# Patient Record
Sex: MALE | Race: WHITE
[De-identification: names, ages, dates, MRNs, and addresses within clinical notes are randomized per-mention and may not be internally consistent; named-entity substitution may affect disease eponyms.]

---

## 2019-10-07 PROBLEM — Z00.00 ENCOUNTER FOR PREVENTIVE HEALTH EXAMINATION: Status: ACTIVE | Noted: 2019-10-07

## 2019-10-10 ENCOUNTER — APPOINTMENT (OUTPATIENT)
Dept: ORTHOPEDIC SURGERY | Facility: CLINIC | Age: 64
End: 2019-10-10
Payer: COMMERCIAL

## 2019-10-10 PROCEDURE — 99213 OFFICE O/P EST LOW 20 MIN: CPT

## 2019-10-10 PROCEDURE — 73562 X-RAY EXAM OF KNEE 3: CPT | Mod: 50

## 2019-10-14 NOTE — PHYSICAL EXAM
[de-identified] : Bilateral knee\par \par Constitutional: \par The patient is healthy-appearing and in no apparent distress. \par \par Gait:\par The patient ambulates with a normal gait and no limp.\par \par Cardiovascular System: \par The capillary refill is less than 2 seconds. \par \par Skin: \par There are no skin abnormalities.\par \par Right Knee:\par  \par Bony Palpation: \par There is no tenderness of the medial joint line. \par There is no tenderness of the lateral joint line.\par There is tenderness of the medial femoral chondyle.\par There is no tenderness of the lateral femoral chondyle.\par There is no tenderness of the tibial tubercle.\par There is no tenderness of the superior patella.\par There is no tenderness of the inferior patella.\par There is tenderness of the medial patellar facet.\par There is tenderness of the lateral patellar facet.\par \par Soft Tissue Palpation: \par There is no tenderness of the medial retinaculum.\par There is no tenderness of the lateral retinaculum.\par There is no tenderness of the quadriceps tendon.\par There is no tenderness of the patella tendon.\par There is no tenderness of the ITB.\par There is no tenderness of the pes anserine.\par \par Active Range of Motion: \par The range of motion at the knee actively and passively is full. \par \par Special Tests: \par There is a negative Apley.\par There is a negative Steinmanns. \par There is a negative Lachman and Anterior Drawer.\par There is a negative Posterior Drawer.  \par There is no varus or valgus laxity.\par Strength: \par There is 5/5 hip flexion and 5/5 knee flexion and extension.  \par \par Left Knee:\par  \par Bony Palpation: \par There is no tenderness of the medial joint line. \par There is no tenderness of the lateral joint line.\par There is tenderness of the medial femoral chondyle.\par There is no tenderness of the lateral femoral chondyle.\par There is no tenderness of the tibial tubercle.\par There is no tenderness of the superior patella.\par There is no tenderness of the inferior patella.\par There is tenderness of the medial patellar facet.\par There is tenderness of the lateral patellar facet.\par \par Soft Tissue Palpation: \par There is no tenderness of the medial retinaculum.\par There is no tenderness of the lateral retinaculum.\par There is no tenderness of the quadriceps tendon.\par There is no tenderness of the patella tendon.\par There is no tenderness of the ITB.\par There is no tenderness of the pes anserine.\par \par Active Range of Motion: \par The range of motion at the knee actively and passively is full. \par \par Special Tests: \par There is a negative Apley.\par There is a negative Steinmanns. \par There is a negative Lachman and Anterior Drawer.\par There is a negative Posterior Drawer.  \par There is no varus or valgus laxity.\par \par Strength: \par There is 5/5 hip flexion and 5/5 knee flexion and extension.  \par \par Psychiatric: \par The patient demonstrates a normal mood and affect and is active and alert [de-identified] : X-ray bilateral knee. There is mild medial lateral compartment arthritis and mild to moderate patellofemoral arthritis

## 2019-10-14 NOTE — ASSESSMENT
[FreeTextEntry1] : Patient given home exercises. If no improvement consideration of cortisone injections\par

## 2019-10-14 NOTE — HISTORY OF PRESENT ILLNESS
[de-identified] : Patient is here for both knees today, primarily the LEFT. Patient has previously seen Dr. Troy. Pain is worst at night, wakes him up. Pain with standing and going down stairs. Clicking in left knee.

## 2019-10-16 ENCOUNTER — RX RENEWAL (OUTPATIENT)
Age: 64
End: 2019-10-16

## 2019-10-16 RX ORDER — NABUMETONE 500 MG/1
500 TABLET, FILM COATED ORAL
Qty: 30 | Refills: 2 | Status: ACTIVE | COMMUNITY
Start: 2019-10-16 | End: 1900-01-01

## 2021-02-11 ENCOUNTER — APPOINTMENT (OUTPATIENT)
Dept: ORTHOPEDIC SURGERY | Facility: CLINIC | Age: 66
End: 2021-02-11
Payer: COMMERCIAL

## 2021-02-11 PROCEDURE — 99213 OFFICE O/P EST LOW 20 MIN: CPT

## 2021-02-11 PROCEDURE — 99072 ADDL SUPL MATRL&STAF TM PHE: CPT

## 2021-02-11 PROCEDURE — 73562 X-RAY EXAM OF KNEE 3: CPT | Mod: LT

## 2021-02-11 NOTE — ASSESSMENT
[FreeTextEntry1] : Discussed at length with patient exam history and imaging at this time patient elects home exercises only.  Offered anti-inflammatories physical therapy and injection and patient declines.  If persistent discomfort patient to follow up in office

## 2021-02-11 NOTE — HISTORY OF PRESENT ILLNESS
[de-identified] : Patient is an established patient last seen back in October 2019 for bilateral knee arthritis.  He states overall he been doing rather well less so we just had some left knee discomfort atraumatic in onset.  Denies any swelling.  States some improvement with anti-inflammatory.  States mild discomfort with stairs onlyPatient is an established patient last seen back in October 2019 for bilateral knee arthritis.  He states overall he been doing rather well less so we just had some left knee discomfort atraumatic in onset.  Denies any swelling.  States some improvement with anti-inflammatory.  States mild discomfort with stairs only

## 2021-02-11 NOTE — PHYSICAL EXAM
[de-identified] : Left knee\par \par Constitutional: \par The patient is healthy-appearing and in no apparent distress. \par \par Gait:\par The patient ambulates with a normal gait and no limp.\par \par Cardiovascular System: \par The capillary refill is less than 2 seconds. \par \par Skin: \par There are no skin abnormalities.\par \par Left Knee:\par  \par Bony Palpation: \par There is no tenderness of the medial joint line. \par There is no tenderness of the lateral joint line.\par There is no tenderness of the medial femoral chondyle.\par There is no tenderness of the lateral femoral chondyle.\par There is no tenderness of the tibial tubercle.\par There is no tenderness of the superior patella.\par There is no tenderness of the inferior patella.\par There is tenderness of the medial patellar facet.\par There is tenderness of the lateral patellar facet.\par \par Soft Tissue Palpation: \par There is no tenderness of the medial retinaculum.\par There is no tenderness of the lateral retinaculum.\par There is no tenderness of the quadriceps tendon.\par There is no tenderness of the patella tendon.\par There is no tenderness of the ITB.\par There is no tenderness of the pes anserine.\par \par Active Range of Motion: \par The range of motion at the knee actively and passively is full. \par \par Special Tests: \par There is a negative Apley.\par There is a negative Steinmanns. \par There is a negative Lachman and Anterior Drawer.\par There is a negative Posterior Drawer.  \par There is no varus or valgus laxity.\par \par Strength: \par There is 5/5 hip flexion and 5/5 knee flexion and extension.  \par \par Psychiatric: \par The patient demonstrates a normal mood and affect and is active and alert [de-identified] : X-ray left knee.  There is mild tricompartmental arthritis\par

## 2021-02-25 ENCOUNTER — TRANSCRIPTION ENCOUNTER (OUTPATIENT)
Age: 66
End: 2021-02-25

## 2021-11-04 ENCOUNTER — APPOINTMENT (OUTPATIENT)
Dept: ORTHOPEDIC SURGERY | Facility: CLINIC | Age: 66
End: 2021-11-04
Payer: MEDICARE

## 2021-11-04 DIAGNOSIS — M25.551 PAIN IN RIGHT HIP: ICD-10-CM

## 2021-11-04 DIAGNOSIS — M70.60 TROCHANTERIC BURSITIS, UNSPECIFIED HIP: ICD-10-CM

## 2021-11-04 PROCEDURE — 72100 X-RAY EXAM L-S SPINE 2/3 VWS: CPT

## 2021-11-04 PROCEDURE — 99213 OFFICE O/P EST LOW 20 MIN: CPT

## 2021-11-04 PROCEDURE — 73502 X-RAY EXAM HIP UNI 2-3 VIEWS: CPT | Mod: RT

## 2021-11-04 NOTE — ASSESSMENT
[FreeTextEntry1] : Discussed at length with patient exam history and imaging as well as treatment options.  Discussed in particular stiffness and tightness of the IT band and recommendation for home exercises.  Offered physical therapy but patient elects home exercises only at this time and he will continue on his gabapentin.  If no improvement consideration of MRI of lumbar spine consideration of epidural steroid injection and possible need for a spine surgeon referral if nonoperative treatment provide relief.\par

## 2021-11-04 NOTE — PHYSICAL EXAM
[de-identified] : Lumbar back\par \par Constitutional: \par The patient is healthy-appearing and in no apparent distress. \par \par Gait and Station: \par The patient ambulates with a normal gait, no significant limp. \par \par Cardiovascular System: \par There is bilateral lower extremity capillary refill less than 2 seconds. \par \par Skin: \par There are no skin abnormalities of the lumbar spine.\par \par Lumbar Spine: \par \par Inspection: \par There is no induration, ecchymosis, or swelling. \par \par Bony Palpation: \par There is no tenderness of the spinous processes.\par There is no tenderness of the iliac crest.\par There is no tenderness of either ASIS.\par There is no tenderness of either PSIS\par There is no tenderness of the greater trochanters.\par There is no tenderness of the right SI joint.\par There is no tenderness of the left SI joint. \par \par Soft Tissue Palpation:\par There is tenderness of the paraspinal region at L4/5. \par  \par Active Range of Motion: \par There is normal lateral flexion and rotation. \par \par Motor Strength: \par There is 5/5 hip flexion, knee extension and flexion, ankle dorsiflexion and plantarflexion.\par \par Neurological System: \par There is normal sensation to light touch on bilateral lower extremities.\par There is a negative supine straight leg raising test.\par There is a negative seated straight leg raising test.  \par There is no clonus. \par \par Right hip\par \par Constitutional: \par The patient is healthy-appearing and in no apparent distress. \par \par Gait:\par The patient ambulates with a normal gait and no significant limp.\par \par Cardiovascular System: \par There is capillary refill less than 2 seconds. \par \par Skin: \par There is no skin abnormalities.\par \par Lumbar Spine;\par There is no significance malalignment and no tenderness to the paraspinal musculature.\par \par Right hip:\par \par Bony Palpation: \par There is no tenderness of the iliac crest.\par There is no tenderness of the ASIS.\par There is no tenderness of the PSIS.\par There is no tenderness of the SI joint.\par There is tenderness of the greater trochanter. \par \par Soft Tissue Palpation: \par There is no tenderness of the hip adductors.\par There is no tenderness of the hip abductors.\par There is no tenderness of the hamstrings. \par There is no tenderness of the piriformis. \par There is no tenderness of the hip flexors.\par \par Active Range of Motion: \par There is full range of motion at the hip actively and passively. \par \par Special Tests: \par There is a positive Edwin's test.\par There is a negative Rik-Michael test. \par \par Strength: \par There is 5/5 hip flexion, adduction, abduction.  \par \par Psychiatric: \par The patient demonstrates a normal mood and affect and is active and alert.\par \par \par \par \par \par  [de-identified] : Given patient's reported history and physical examination, x-ray evaluation ( as listed below ) was ordered and performed to aid in diagnosis and treatment of the patient.\par X-ray right hip.  There is minimal medial joint space narrowing of the hip otherwise no significant bony abnormality.\par X-ray lumbar spine.  There is a one spondylolisthesis of L4 on 5 with corresponding foraminal stenosis and mild L3-4 disc space narrowing.  This is consistent with positioning of an MRI from one year ago\par

## 2021-11-04 NOTE — HISTORY OF PRESENT ILLNESS
[de-identified] : Location: Right hip ( lateral ) and lumbar back pain\par Duration: 1 year - worse in the last 3 days\par Context: atraumatic\par Quality: sharp, achy\par Aggravating factors: lying down\par Conservative treatment: Gabapentin, stretching, PT\par Prior studies: Right hip MRI (10/26/21), Lumbar spine MRI (12/14/20 [facility]), EMG (11/24/20 [place])

## 2022-03-24 ENCOUNTER — APPOINTMENT (OUTPATIENT)
Dept: ORTHOPEDIC SURGERY | Facility: CLINIC | Age: 67
End: 2022-03-24
Payer: MEDICARE

## 2022-03-24 DIAGNOSIS — M54.50 LOW BACK PAIN, UNSPECIFIED: ICD-10-CM

## 2022-03-24 DIAGNOSIS — M43.10 SPONDYLOLISTHESIS, SITE UNSPECIFIED: ICD-10-CM

## 2022-03-24 DIAGNOSIS — M48.061 SPINAL STENOSIS, LUMBAR REGION WITHOUT NEUROGENIC CLAUDICATION: ICD-10-CM

## 2022-03-24 PROCEDURE — 99213 OFFICE O/P EST LOW 20 MIN: CPT

## 2022-03-24 NOTE — HISTORY OF PRESENT ILLNESS
[de-identified] : Patient is an established patient seen back in November for lumbar back pain with sciatica.  He states overall with home exercises he has improved and intermittently has some discomfort denies any recent fall or trauma denies any weakness.  He states his neurologist recently increased his gabapentin and he feels this has helped his symptoms

## 2022-03-24 NOTE — PHYSICAL EXAM
[de-identified] : Lumbar back\par \par Constitutional: \par The patient is healthy-appearing and in no apparent distress. \par \par Gait and Station: \par The patient ambulates with a normal gait, no significant limp. \par \par Cardiovascular System: \par There is bilateral lower extremity capillary refill less than 2 seconds. \par \par Skin: \par There are no skin abnormalities of the lumbar spine.\par \par Lumbar Spine: \par \par Inspection: \par There is no induration, ecchymosis, or swelling. \par \par Bony Palpation: \par There is no tenderness of the spinous processes.\par There is no tenderness of the iliac crest.\par There is no tenderness of either ASIS.\par There is no tenderness of either PSIS\par There is no tenderness of the greater trochanters.\par There is no tenderness of the right SI joint.\par There is no tenderness of the left SI joint. \par \par Soft Tissue Palpation:\par There is tenderness of the paraspinal region at L4/5. \par  \par Active Range of Motion: \par There is normal lateral flexion and rotation. \par \par Motor Strength: \par There is 5/5 hip flexion, knee extension and flexion, ankle dorsiflexion and plantarflexion.\par \par Neurological System: \par There is normal sensation to light touch on bilateral lower extremities.\par There is a negative supine straight leg raising test.\par There is a negative seated straight leg raising test.  \par There is no clonus. \par \par Right hip\par \par Constitutional: \par The patient is healthy-appearing and in no apparent distress. \par \par Gait:\par The patient ambulates with a normal gait and no significant limp.\par \par Cardiovascular System: \par There is capillary refill less than 2 seconds. \par \par Skin: \par There is no skin abnormalities.\par \par Lumbar Spine;\par There is no significance malalignment and no tenderness to the paraspinal musculature.\par \par Right hip:\par \par Bony Palpation: \par There is no tenderness of the iliac crest.\par There is no tenderness of the ASIS.\par There is no tenderness of the PSIS.\par There is no tenderness of the SI joint.\par There is tenderness of the greater trochanter. \par \par Soft Tissue Palpation: \par There is no tenderness of the hip adductors.\par There is no tenderness of the hip abductors.\par There is no tenderness of the hamstrings. \par There is no tenderness of the piriformis. \par There is no tenderness of the hip flexors.\par \par Active Range of Motion: \par There is full range of motion at the hip actively and passively. \par \par Special Tests: \par There is a positive Edwin's test.\par There is a negative Rik-Michael test. \par \par Strength: \par There is 5/5 hip flexion, adduction, abduction.  \par \par Psychiatric: \par The patient demonstrates a normal mood and affect and is active and alert.\par \par \par \par \par \par

## 2022-09-08 ENCOUNTER — APPOINTMENT (OUTPATIENT)
Dept: ORTHOPEDIC SURGERY | Facility: CLINIC | Age: 67
End: 2022-09-08

## 2022-09-08 DIAGNOSIS — M75.51 BURSITIS OF RIGHT SHOULDER: ICD-10-CM

## 2022-09-08 DIAGNOSIS — M25.511 PAIN IN RIGHT SHOULDER: ICD-10-CM

## 2022-09-08 PROCEDURE — 20610 DRAIN/INJ JOINT/BURSA W/O US: CPT | Mod: RT

## 2022-09-08 PROCEDURE — 73030 X-RAY EXAM OF SHOULDER: CPT | Mod: RT

## 2022-09-08 PROCEDURE — 99213 OFFICE O/P EST LOW 20 MIN: CPT | Mod: 25

## 2022-09-08 NOTE — PROCEDURE
[de-identified] : Patient has demonstrated limited relief from NSAIDS, rest, exercises / PT, and after discussion of the risks and benefits, the patient has elected to proceed with a corticosteroid injection into the RIGHT shoulder via Posterolateral site.\par Confirmed that the patient does not have history of prior adverse reactions, active, infections, or relevant allergies.   There was no erythema or warmth, and the skin was clear.  The skin was sterilized with alcohol and via sterile technique, the shoulder was injected with 3 cc of 1% xylocaine and 40 mg of Kenalog.  The injection was completed without complication and a bandage was applied.  The patient tolerated the procedure well and was given post-injection instructions.\par

## 2022-09-08 NOTE — ASSESSMENT
[FreeTextEntry1] : Discussed at length with patient exam history and imaging as well as treatment options and at this time patient elects home exercises cortisone injection and if no improvement consideration to MRI evaluation

## 2022-09-08 NOTE — HISTORY OF PRESENT ILLNESS
[de-identified] : Location: Right shoulder- lateral\par Duration: 8-9 weeks\par Context: hit in the shoulder\par Quality: achy, radiating\par Aggravating factors: lifting, ROM\par Associated symptoms:N/A\par Conservative treatment: Advil, ice\par Prior studies: N/A

## 2022-09-08 NOTE — PHYSICAL EXAM
[de-identified] : Right Shoulder:\par Constitutional:\par The patient is healthy-appearing and in no apparent distress. \par \par Cardiovascular System: \par The capillary refill is less than 2 seconds. \par \par Skin: \par There are no skin abnormalities.\par \par C-Spine/Neck:\par \par Active Range of Motion:\par Flexion				50\par Extension			60\par Lateral rotation			80  \par \par Right Shoulder: \par Inspection: \par There is no atrophy, erythema, warmth, swelling.\par There is no scapular winging.\par There is no AC prominence. \par \par Bony Palpation: \par There is no tenderness of the clavicle.\par There is no tenderness of the acromioclavicular joint.\par There is no tenderness of the greater tuberosity. \par There is no tenderness of the bicipital groove.\par  \par Soft Tissue Palpation: \par There is no tenderness of the trapezius.\par There is no tenderness of the rhomboid.\par There is no tenderness of the subacromial bursa. \par \par Active Range of Motion: \par Forward flexion- 				180 \par Abduction-					150\par External rotation at 0 degrees abduction-	80 \par Internal rotation at 0 degrees abduction-	80\par \par Passive Range of Motion: \par Forward flexion- 			180 \par Abduction-				150\par External rotation at 0 deg abduction-	80 \par Internal rotation at 0 deg abduction-	80\par \par Special Tests: \par Hawkin's  				Positive \par Neer's  				Positive \par Speed's  				Negative\par AC cross-over 			            Negative\par [place]'s  				Negative\par \par Stability: \par There is no general laxity. \par There is no anterior apprehension.\par \par Strength: \par Supraspinatus abduction 		5/5\par External rotation at 0 deg abduction 	5/5\par Internal rotation at 0 deg abduction	5/5\par Scapular elevation 			5/5 \par \par Neurological System: \par \par There is normal sensation to light touch C5-T1. \par \par Stability: \par There is no general laxity. \par \par Psychiatric: \par The patient demonstrates a normal mood and affect and is active and alert.\par  [de-identified] : Given patient's reported history and physical examination, x-ray evaluation ( as listed below ) was ordered and performed to aid in diagnosis and treatment of the patient.\par X-ray right shoulder.  There is no significant bony / soft tissue abnormality, arthritis, or fracture.\par

## 2022-12-22 ENCOUNTER — APPOINTMENT (OUTPATIENT)
Dept: ORTHOPEDIC SURGERY | Facility: CLINIC | Age: 67
End: 2022-12-22

## 2022-12-22 DIAGNOSIS — M17.10 UNILATERAL PRIMARY OSTEOARTHRITIS, UNSPECIFIED KNEE: ICD-10-CM

## 2022-12-22 DIAGNOSIS — S80.02XA CONTUSION OF LEFT KNEE, INITIAL ENCOUNTER: ICD-10-CM

## 2022-12-22 PROCEDURE — 73562 X-RAY EXAM OF KNEE 3: CPT | Mod: LT

## 2022-12-22 PROCEDURE — 99213 OFFICE O/P EST LOW 20 MIN: CPT

## 2022-12-27 PROBLEM — M17.10 ARTHRITIS OF KNEE: Status: ACTIVE | Noted: 2019-10-10

## 2022-12-27 PROBLEM — S80.02XA CONTUSION OF LEFT KNEE, INITIAL ENCOUNTER: Status: ACTIVE | Noted: 2022-12-27

## 2022-12-27 NOTE — HISTORY OF PRESENT ILLNESS
[de-identified] : location: left knee\par duration: worsened since sunday 12/18\par context: intermittent pain s/p fall 2 weeks ago\par quality: aching\par aggravating factors:putting pressure above left knee; prolonged sitting\par associated sx: N/A\par conservative tx: diclofenac, aleve\par prior studies: N/A

## 2022-12-27 NOTE — PHYSICAL EXAM
[de-identified] : Left knee\par \par Constitutional: \par The patient is healthy-appearing and in no apparent distress. \par \par Gait:\par The patient ambulates with a normal gait and no limp.\par \par Cardiovascular System: \par The capillary refill is less than 2 seconds. \par \par Skin: \par There are no skin abnormalities.\par \par Left Knee:\par  \par Bony Palpation: \par There is no tenderness of the medial joint line. \par There is no tenderness of the lateral joint line.\par There is no tenderness of the medial femoral chondyle.\par There is no tenderness of the lateral femoral chondyle.\par There is no tenderness of the tibial tubercle.\par There is no tenderness of the superior patella.\par There is no tenderness of the inferior patella.\par There is tenderness of the medial patellar facet.\par There is tenderness of the lateral patellar facet.\par \par Soft Tissue Palpation: \par There is no tenderness of the MCL.\par There is tenderness of the medial retinaculum.\par There is no tenderness of the lateral retinaculum.\par There is no tenderness of the LCL.\par There is no tenderness of the quadriceps tendon.\par There is no tenderness of the patella tendon.\par There is no tenderness of the ITB.\par There is no tenderness of the pes anserine.\par \par Active Range of Motion: \par The range of motion at the knee actively and passively is full. \par \par Special Tests: \par There is a negative Apley.\par There is a negative Steinmanns. \par There is a negative Lachman and Anterior Drawer.\par There is a negative Posterior Drawer.  \par There is no varus or valgus laxity.\par \par Strength: \par There is 5/5 hip flexion and 5/5 knee flexion and extension.  \par \par Psychiatric: \par The patient demonstrates a normal mood and affect and is active and alert [de-identified] : Given patient's reported history and physical examination, x-ray evaluation ( as listed below ) was ordered and performed to aid in diagnosis and treatment of the patient.\par X-ray left knee.  There's mild patellofemoral arthritis and medial joint space narrowing but no evidence of any fracture\par

## 2022-12-27 NOTE — ASSESSMENT
[FreeTextEntry1] : Discussed at length the patient exam history and imaging at this time patient elects observation and will followup on as needed basis

## 2023-10-23 ENCOUNTER — APPOINTMENT (OUTPATIENT)
Dept: ORTHOPEDIC SURGERY | Facility: CLINIC | Age: 68
End: 2023-10-23

## 2024-02-26 ENCOUNTER — APPOINTMENT (OUTPATIENT)
Dept: ORTHOPEDIC SURGERY | Facility: CLINIC | Age: 69
End: 2024-02-26
Payer: MEDICARE

## 2024-02-26 VITALS — WEIGHT: 144 LBS | BODY MASS INDEX: 22.6 KG/M2 | HEIGHT: 67 IN

## 2024-02-26 DIAGNOSIS — M17.12 UNILATERAL PRIMARY OSTEOARTHRITIS, LEFT KNEE: ICD-10-CM

## 2024-02-26 PROCEDURE — 73562 X-RAY EXAM OF KNEE 3: CPT | Mod: LT

## 2024-02-26 PROCEDURE — G2211 COMPLEX E/M VISIT ADD ON: CPT

## 2024-02-26 PROCEDURE — 99213 OFFICE O/P EST LOW 20 MIN: CPT

## 2024-02-26 RX ORDER — DICLOFENAC SODIUM 75 MG/1
75 TABLET, DELAYED RELEASE ORAL
Qty: 60 | Refills: 2 | Status: ACTIVE | COMMUNITY
Start: 2024-02-26 | End: 1900-01-01

## 2024-02-26 RX ORDER — HYDROCODONE BITARTRATE AND ACETAMINOPHEN 5; 300 MG/1; MG/1
5-300 TABLET ORAL
Qty: 10 | Refills: 0 | Status: ACTIVE | COMMUNITY
Start: 2024-02-26 | End: 1900-01-01

## 2024-02-26 RX ORDER — DICLOFENAC SODIUM 3 G/100G
3 GEL TOPICAL TWICE DAILY
Qty: 1 | Refills: 4 | Status: ACTIVE | COMMUNITY
Start: 2024-02-26 | End: 1900-01-01

## 2024-02-26 NOTE — PHYSICAL EXAM
[de-identified] : Left knee\par  \par  Constitutional: \par  The patient is healthy-appearing and in no apparent distress. \par  \par  Gait:\par  The patient ambulates with a normal gait and no limp.\par  \par  Cardiovascular System: \par  The capillary refill is less than 2 seconds. \par  \par  Skin: \par  There are no skin abnormalities.\par  \par  Left Knee:\par   \par  Bony Palpation: \par  There is no tenderness of the medial joint line. \par  There is no tenderness of the lateral joint line.\par  There is no tenderness of the medial femoral chondyle.\par  There is no tenderness of the lateral femoral chondyle.\par  There is no tenderness of the tibial tubercle.\par  There is no tenderness of the superior patella.\par  There is no tenderness of the inferior patella.\par  There is tenderness of the medial patellar facet.\par  There is tenderness of the lateral patellar facet.\par  \par  Soft Tissue Palpation: \par  There is no tenderness of the MCL.\par  There is tenderness of the medial retinaculum.\par  There is no tenderness of the lateral retinaculum.\par  There is no tenderness of the LCL.\par  There is no tenderness of the quadriceps tendon.\par  There is no tenderness of the patella tendon.\par  There is no tenderness of the ITB.\par  There is no tenderness of the pes anserine.\par  \par  Active Range of Motion: \par  The range of motion at the knee actively and passively is full. \par  \par  Special Tests: \par  There is a negative Apley.\par  There is a negative Steinmanns. \par  There is a negative Lachman and Anterior Drawer.\par  There is a negative Posterior Drawer.  \par  There is no varus or valgus laxity.\par  \par  Strength: \par  There is 5/5 hip flexion and 5/5 knee flexion and extension.  \par  \par  Psychiatric: \par  The patient demonstrates a normal mood and affect and is active and alert [de-identified] : Given patient's reported history and physical examination, x-ray evaluation ( as listed below ) was ordered and performed to aid in diagnosis and treatment of the patient.\par  X-ray left knee.  There's mild patellofemoral arthritis and medial joint space narrowing but no evidence of any fracture\par

## 2024-02-26 NOTE — HISTORY OF PRESENT ILLNESS
[de-identified] : Last Visit:12/22/2022 Reason: Left knee pain Pain:2/10 Symptoms:  dull pain/ worse at night  Prior studies: x rays ordered

## 2024-07-22 ENCOUNTER — RX RENEWAL (OUTPATIENT)
Age: 69
End: 2024-07-22

## 2024-09-30 ENCOUNTER — NON-APPOINTMENT (OUTPATIENT)
Age: 69
End: 2024-09-30

## 2024-10-08 ENCOUNTER — APPOINTMENT (OUTPATIENT)
Dept: ORTHOPEDIC SURGERY | Facility: CLINIC | Age: 69
End: 2024-10-08
Payer: MEDICARE

## 2024-10-08 DIAGNOSIS — M75.51 BURSITIS OF RIGHT SHOULDER: ICD-10-CM

## 2024-10-08 PROCEDURE — 99213 OFFICE O/P EST LOW 20 MIN: CPT | Mod: 25

## 2024-10-08 PROCEDURE — 20610 DRAIN/INJ JOINT/BURSA W/O US: CPT | Mod: RT

## 2024-10-08 PROCEDURE — 73030 X-RAY EXAM OF SHOULDER: CPT | Mod: RT

## 2024-10-28 ENCOUNTER — APPOINTMENT (OUTPATIENT)
Dept: ORTHOPEDIC SURGERY | Facility: CLINIC | Age: 69
End: 2024-10-28

## 2024-11-27 ENCOUNTER — APPOINTMENT (OUTPATIENT)
Dept: ORTHOPEDIC SURGERY | Facility: CLINIC | Age: 69
End: 2024-11-27
Payer: MEDICARE

## 2024-11-27 DIAGNOSIS — M75.51 BURSITIS OF RIGHT SHOULDER: ICD-10-CM

## 2024-11-27 DIAGNOSIS — S46.011A STRAIN OF MUSCLE(S) AND TENDON(S) OF THE ROTATOR CUFF OF RIGHT SHOULDER, INITIAL ENCOUNTER: ICD-10-CM

## 2024-11-27 PROCEDURE — 99214 OFFICE O/P EST MOD 30 MIN: CPT | Mod: 25

## 2024-11-27 PROCEDURE — 20610 DRAIN/INJ JOINT/BURSA W/O US: CPT | Mod: RT
